# Patient Record
Sex: MALE | Race: WHITE | URBAN - METROPOLITAN AREA
[De-identification: names, ages, dates, MRNs, and addresses within clinical notes are randomized per-mention and may not be internally consistent; named-entity substitution may affect disease eponyms.]

---

## 2017-08-16 ENCOUNTER — HISTORICAL (OUTPATIENT)
Dept: ADMINISTRATIVE | Facility: HOSPITAL | Age: 52
End: 2017-08-16

## 2017-08-24 ENCOUNTER — HISTORICAL (OUTPATIENT)
Dept: RADIOLOGY | Facility: HOSPITAL | Age: 52
End: 2017-08-24

## 2017-08-24 LAB
ABS NEUT (OLG): 3.75 X10(3)/MCL (ref 2.1–9.2)
BASOPHILS # BLD AUTO: 0.05 X10(3)/MCL
BASOPHILS NFR BLD AUTO: 1 % (ref 0–1)
BUN SERPL-MCNC: 15 MG/DL (ref 7–18)
CALCIUM SERPL-MCNC: 9.2 MG/DL (ref 8.5–10.1)
CHLORIDE SERPL-SCNC: 105 MMOL/L (ref 98–107)
CO2 SERPL-SCNC: 30 MMOL/L (ref 21–32)
CREAT SERPL-MCNC: 0.8 MG/DL (ref 0.6–1.3)
CRP SERPL-MCNC: 0.3 MG/DL
EOSINOPHIL # BLD AUTO: 0.38 10*3/UL
EOSINOPHIL NFR BLD AUTO: 6 % (ref 0–5)
ERYTHROCYTE [DISTWIDTH] IN BLOOD BY AUTOMATED COUNT: 12.9 % (ref 11.5–14.5)
ERYTHROCYTE [SEDIMENTATION RATE] IN BLOOD: 6 MM/HR (ref 0–15)
GLUCOSE SERPL-MCNC: 96 MG/DL (ref 74–106)
HCT VFR BLD AUTO: 40.8 % (ref 40–51)
HGB BLD-MCNC: 13.8 GM/DL (ref 13.5–17.5)
IMM GRANULOCYTES # BLD AUTO: 0.04 10*3/UL
IMM GRANULOCYTES NFR BLD AUTO: 1 %
LYMPHOCYTES # BLD AUTO: 2.05 X10(3)/MCL
LYMPHOCYTES NFR BLD AUTO: 30 % (ref 15–40)
MCH RBC QN AUTO: 34.5 PG (ref 26–34)
MCHC RBC AUTO-ENTMCNC: 33.8 GM/DL (ref 31–37)
MCV RBC AUTO: 102 FL (ref 80–100)
MONOCYTES # BLD AUTO: 0.59 X10(3)/MCL
MONOCYTES NFR BLD AUTO: 9 % (ref 4–12)
NEUTROPHILS # BLD AUTO: 3.75 X10(3)/MCL
NEUTROPHILS NFR BLD AUTO: 55 X10(3)/MCL
PLATELET # BLD AUTO: 243 X10(3)/MCL (ref 130–400)
PMV BLD AUTO: 9 FL (ref 7.4–10.4)
POTASSIUM SERPL-SCNC: 5.8 MMOL/L (ref 3.5–5.1)
RBC # BLD AUTO: 4 X10(6)/MCL (ref 4.5–5.9)
SODIUM SERPL-SCNC: 140 MMOL/L (ref 136–145)
WBC # SPEC AUTO: 6.9 X10(3)/MCL (ref 4.5–11)

## 2022-04-10 ENCOUNTER — HISTORICAL (OUTPATIENT)
Dept: ADMINISTRATIVE | Facility: HOSPITAL | Age: 57
End: 2022-04-10

## 2022-04-27 VITALS
SYSTOLIC BLOOD PRESSURE: 154 MMHG | WEIGHT: 136.69 LBS | HEIGHT: 67 IN | DIASTOLIC BLOOD PRESSURE: 98 MMHG | BODY MASS INDEX: 21.45 KG/M2

## 2022-05-03 NOTE — HISTORICAL [facility] CERNER
This is a historical note converted from [facility]. Formatting and pictures may have been removed.  Please reference [facility] for original formatting and attached multimedia. Chief Complaint  Referral for RW--prev fx w/ORIF, dog bite on 7/25/17--fxd screws  History of Present Illness  50 y/o M RHD smoker, 2 weeks s/p exacerbation of right wrist pain when he was bitten by a very small dog around the distal forearm. Patient has had no significant change in symptoms from prior to his dog bite injury  Notably, patient had a very bad right wrist fracture in 2011 which was treated with complex ORIF, he noted initial good motion but gradual progression of deformity and loss of function over the last year. He does also had mild paresthesias in the thumb-long fingers, has tried bracing.  Review of Systems  Negative  Physical Exam  Vitals & Measurements  HR:?94?(Peripheral)? BP:?154/98? HT:?170.18?cm? HT:?170.18?cm? WT:?62?kg? WT:?62?kg? BMI:?21.41?  Awake, alert NAD  NLB on RA  RRR per pulse  EOMI  NCAT  ?  RUE  Deformity noted  volar incision well healed  No signs of erythema or infection  ROM limited with hard endpoint, Ext 30, flex 30, minimal ulnar/radial deviation, limited pronosupination  Weak  strength  LTS intact but diminished M, intact normal R/U  Motor intact, EPL, FPL intact  2+ RP  + Tinel at CT  + [place]  Assessment/Plan  1.?Other fractures of lower end of right radius, initial encounter for closed fracture  Patient had minimal exacerbation of a chronic injury,??would like something done but is not in a hurry as he will need to arrange his life to plan  Will begin pre?op workup with labs/imaging below, NCV to?dx CT as concomitant issue, smoking cessation  Ordered:  Basic Metabolic Panel, Routine collect, *Est. 02/16/18 3:00:00 CST, Blood, Order for future visit, *Est. Stop date 02/16/18 3:00:00 CST, Lab Collect, Other fractures of lower end of right radius, initial encounter for closed fracture, 6, month(s), In  Approximately, 08/16/17...  Basic Metabolic Panel, Routine collect, *Est. 08/23/17 3:00:00 CDT, Blood, Order for future visit, *Est. Stop date 08/23/17 3:00:00 CDT, Lab Collect, Other fractures of lower end of right radius, initial encounter for closed fracture, 1, week(s), In Approximately, 08/16/17...  CBC w/ Auto Diff, Routine collect, *Est. 02/16/18 3:00:00 CST, Blood, Order for future visit, *Est. Stop date 02/16/18 3:00:00 CST, Lab Collect, Other fractures of lower end of right radius, initial encounter for closed fracture, 6, month(s), In Approximately, 08/16/17...  CBC w/ Auto Diff, Routine collect, *Est. 08/23/17 3:00:00 CDT, Blood, Order for future visit, *Est. Stop date 08/23/17 3:00:00 CDT, Lab Collect, Other fractures of lower end of right radius, initial encounter for closed fracture, 1, week(s), In Approximately, 08/16/17...  Clinic Follow up, *Est. 09/13/17 3:00:00 CDT, Order for future visit, Other fractures of lower end of right radius, initial encounter for closed fracture, [facility]  Clinic Follow up, *Est. 09/13/17 3:00:00 CDT, Order for future visit, Other fractures of lower end of right radius, initial encounter for closed fracture, [facility]  CRP, Routine collect, *Est. 08/23/17 3:00:00 CDT, Blood, Order for future visit, *Est. Stop date 08/23/17 3:00:00 CDT, Lab Collect, Other fractures of lower end of right radius, initial encounter for closed fracture, 1, week(s), In Approximately, 08/16/17...  CRP, Routine collect, *Est. 02/16/18 3:00:00 CST, Blood, Order for future visit, *Est. Stop date 02/16/18 3:00:00 CST, Lab Collect, Other fractures of lower end of right radius, initial encounter for closed fracture, 6, month(s), In Approximately, 08/16/17...  CT Extremity Upper Right W/O Contrast, Routine, *Est. 08/23/17 3:00:00 CDT, Trauma, None, Ambulatory, Rad Type, Order for future visit, Other fractures of lower end of right radius, initial encounter for closed fracture, Schedule  this test, [facility], 1, week(s), In A...  CT Extremity Upper Right W/O Contrast, Routine, *Est. 08/23/17 3:00:00 CDT, Trauma, None, Ambulatory, Rad Type, Order for future visit, Other fractures of lower end of right radius, initial encounter for closed fracture, Schedule this test, [facility], 1, week(s), In A...  Nerve Conduction Studies 11-12, 08/16/17 14:48:00 CDT, Stop date 08/16/17 14:48:00 CDT, [facility], Other fractures of lower end of right radius, initial encounter for closed fracture  Nerve Conduction Studies 11-12, 08/16/17 14:45:00 CDT, Stop date 08/16/17 14:45:00 CDT, [facility], Other fractures of lower end of right radius, initial encounter for closed fracture  Sedimentation Rate, Routine collect, *Est. 08/23/17 3:00:00 CDT, Blood, Order for future visit, *Est. Stop date 08/23/17 3:00:00 CDT, Lab Collect, Other fractures of lower end of right radius, initial encounter for closed fracture, 1, week(s), In Approximately, 08/16/17...  Sedimentation Rate, Routine collect, *Est. 02/16/18 3:00:00 CST, Blood, Order for future visit, *Est. Stop date 02/16/18 3:00:00 CST, Lab Collect, Other fractures of lower end of right radius, initial encounter for closed fracture, 6, month(s), In Approximately, 08/16/17...  ?  2.?Tobacco user  Smoking cessation discussed  ?   Problem List/Past Medical History  Tobacco user  Tobacco user  Historical  No historical problems  Procedure/Surgical History  Rt Wrist Fracture.  Medications  Bentyl 20 mg oral tablet, 20 mg, 1 tab(s), Oral, QID  diclofenac sodium 75 mg oral delayed release tablet, 75 mg, 1 tab(s), Oral, BID,? ?Not taking  MUPIROCIN 2% OIN,? ?Not taking  Nexium 20 mg oral delayed release capsule (pt. own), 20 mg, 1 cap(s), Oral, Daily  TRAMADOL HCL 50MG TAB  Allergies  No Known Medication Allergies  Social History  Alcohol - Denies Alcohol Use  Current, Beer, Daily, 3 drinks/episode  average.  Substance Abuse - Denies Substance Abuse  Current, Marijuana, 1-2 times per week  Tobacco  Current every day smoker, Cigarettes, 20 per day. Started age 15.0 Years. Previous treatment: Medications, Nicotine replacement. Ready to change: Yes. Household tobacco concerns: No.  Diagnostic Results  Complex injury with failure of the distal radial hardware, dorsally malunited, severe shortening, ulnar abutement, wrist arthritis      ? Yumiko?medical history, physical exam findings right wrist , diagnosis, and treatment outlined by?Dr. South?has been reviewed.? Treatment plan is determined to be reasonable and appropriate.?I was present during the evaluation. X-rays reviewed. Smoking cessation is important.